# Patient Record
Sex: FEMALE | URBAN - METROPOLITAN AREA
[De-identification: names, ages, dates, MRNs, and addresses within clinical notes are randomized per-mention and may not be internally consistent; named-entity substitution may affect disease eponyms.]

---

## 2023-08-18 ENCOUNTER — HOME HEALTH ADMISSION (OUTPATIENT)
Dept: HOME HEALTH SERVICES | Facility: HOME HEALTH | Age: 83
End: 2023-08-18
Payer: MEDICARE

## 2023-08-20 PROCEDURE — 0221000100 HH NO PAY CLAIM PROCEDURE

## 2023-08-22 ENCOUNTER — HOME CARE VISIT (OUTPATIENT)
Dept: SCHEDULING | Facility: HOME HEALTH | Age: 83
End: 2023-08-22

## 2023-08-22 VITALS — TEMPERATURE: 97.2 F | RESPIRATION RATE: 18 BRPM | HEART RATE: 85 BPM | OXYGEN SATURATION: 96 %

## 2023-08-22 VITALS
RESPIRATION RATE: 18 BRPM | HEART RATE: 84 BPM | OXYGEN SATURATION: 96 % | DIASTOLIC BLOOD PRESSURE: 74 MMHG | TEMPERATURE: 97.5 F | SYSTOLIC BLOOD PRESSURE: 120 MMHG

## 2023-08-22 PROCEDURE — G0300 HHS/HOSPICE OF LPN EA 15 MIN: HCPCS

## 2023-08-22 PROCEDURE — G0151 HHCP-SERV OF PT,EA 15 MIN: HCPCS

## 2023-08-22 ASSESSMENT — ENCOUNTER SYMPTOMS
PAIN LOCATION - PAIN QUALITY: ACHY
STOOL DESCRIPTION: FORMED
DYSPNEA ACTIVITY LEVEL: AFTER AMBULATING 10 - 20 FT
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
## 2023-08-22 NOTE — HOME HEALTH
SN greeted patient in private home resides with spouse. Daugher is present at time of visit. Daughter is visiting from [place]. Patient is AOx4, pleasant, in no apparent distress upon arrival  VSS. HRR, LCTA, BS WNL, Wound care performed using clean technique, patient tolerated well. Patient states appetite is good and hydration is adequate. Patient denies having any pain at this time. Medications reviewed, no issues to report. Patient is homebound due to weakness, impaired functional mobility, risk for fall, risk for infection, patient requires use of assistive device and patient requires assistance of another person to leave home safely. SN educated patient/caregiver on disease process and management, home safety, using assistive device at all times, fall precautions, s/s of infection, taking medications as prescribed, call me first procedure, s/s to report to nurse, physician, and s/s that necessitate calling emergency personnel. Patient/caregiver repeated back and verbalized understanding, all questions answered  Medications reconciled and all medications are available in home  Home health supplies by type and quantity ordered/delivered this visit N/A  Patient education provided this visit: SN educated patient on signs of infection. Patient  verbalizes understanding via the teach back method.    Progress toward goals:Ongoing  Plan for next visit: wound care

## 2023-08-23 ENCOUNTER — HOME CARE VISIT (OUTPATIENT)
Dept: SCHEDULING | Facility: HOME HEALTH | Age: 83
End: 2023-08-23

## 2023-08-23 ENCOUNTER — HOME CARE VISIT (OUTPATIENT)
Dept: HOME HEALTH SERVICES | Facility: HOME HEALTH | Age: 83
End: 2023-08-23

## 2023-08-23 VITALS
HEART RATE: 83 BPM | SYSTOLIC BLOOD PRESSURE: 123 MMHG | TEMPERATURE: 97.2 F | RESPIRATION RATE: 18 BRPM | DIASTOLIC BLOOD PRESSURE: 60 MMHG | OXYGEN SATURATION: 96 %

## 2023-08-23 PROCEDURE — G0152 HHCP-SERV OF OT,EA 15 MIN: HCPCS

## 2023-08-23 PROCEDURE — G0155 HHCP-SVS OF CSW,EA 15 MIN: HCPCS

## 2023-08-23 ASSESSMENT — ENCOUNTER SYMPTOMS
PAIN LOCATION - PAIN QUALITY: ACHE, THROB
HEMOPTYSIS: 0
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
## 2023-08-23 NOTE — HOME HEALTH
decresaed standing tolerance/balance requiring frequent rest periods with ADL's and IADL's and UE support at all times. OT to assess shower transfer when grab bars are installed. Pt would benefit with OT to increase safety and indep with ADL's, IADL's, increase bilat UE strength for functional transfers, adaptive equipment recommendations and training, increasing standing tolerance/balance for safety with ADL's with fewer rest periods. Pt's goal is to return to PLOF and return to meal prep.    Discussed DC plan  with pt and caregiver, plan to DC when goals met

## 2023-08-24 ENCOUNTER — HOME CARE VISIT (OUTPATIENT)
Dept: SCHEDULING | Facility: HOME HEALTH | Age: 83
End: 2023-08-24

## 2023-08-24 PROCEDURE — G0299 HHS/HOSPICE OF RN EA 15 MIN: HCPCS

## 2023-08-25 ENCOUNTER — HOME CARE VISIT (OUTPATIENT)
Dept: SCHEDULING | Facility: HOME HEALTH | Age: 83
End: 2023-08-25

## 2023-08-25 PROCEDURE — G0151 HHCP-SERV OF PT,EA 15 MIN: HCPCS

## 2023-08-27 VITALS — TEMPERATURE: 97.4 F | RESPIRATION RATE: 18 BRPM | OXYGEN SATURATION: 96 % | HEART RATE: 83 BPM

## 2023-08-28 ENCOUNTER — HOME CARE VISIT (OUTPATIENT)
Dept: SCHEDULING | Facility: HOME HEALTH | Age: 83
End: 2023-08-28

## 2023-08-28 VITALS
OXYGEN SATURATION: 97 % | DIASTOLIC BLOOD PRESSURE: 70 MMHG | SYSTOLIC BLOOD PRESSURE: 126 MMHG | HEART RATE: 78 BPM | TEMPERATURE: 97.5 F | RESPIRATION RATE: 16 BRPM

## 2023-08-28 PROCEDURE — G0151 HHCP-SERV OF PT,EA 15 MIN: HCPCS

## 2023-08-28 ASSESSMENT — ENCOUNTER SYMPTOMS: DYSPNEA ACTIVITY LEVEL: AFTER AMBULATING 10 - 20 FT

## 2023-08-28 NOTE — HOME HEALTH
SN for skilled assessments and wound care management/teaching. Wound care completed as ordered in POC, tolerated well. Wound progressing in healing. No s/s of infection. Patient denies any problems with wound care and aware of s/s of infection to report to /MD/91Oralia. VS all wnl, denies any changes in medication, shob, falls, any new open wounds, problems/questions at this time. No s/s of distress during SN visit. Aware and approves of next scheduled SN visit.  Plan for next visit:  wound care, medication/disease teaching/management

## 2023-08-29 ENCOUNTER — HOME CARE VISIT (OUTPATIENT)
Dept: SCHEDULING | Facility: HOME HEALTH | Age: 83
End: 2023-08-29

## 2023-08-29 VITALS — OXYGEN SATURATION: 94 % | TEMPERATURE: 97.2 F | RESPIRATION RATE: 18 BRPM | HEART RATE: 85 BPM

## 2023-08-29 PROCEDURE — G0300 HHS/HOSPICE OF LPN EA 15 MIN: HCPCS

## 2023-08-29 ASSESSMENT — ENCOUNTER SYMPTOMS: PAIN LOCATION - PAIN QUALITY: ACHY
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
## 2023-08-30 VITALS
OXYGEN SATURATION: 98 % | TEMPERATURE: 97.5 F | RESPIRATION RATE: 20 BRPM | HEART RATE: 74 BPM | DIASTOLIC BLOOD PRESSURE: 76 MMHG | SYSTOLIC BLOOD PRESSURE: 120 MMHG

## 2023-08-30 ASSESSMENT — ENCOUNTER SYMPTOMS
COUGH CHARACTERISTICS: PRODUCTIVE
COUGH: 1

## 2023-08-30 NOTE — HOME HEALTH
SN greeted patient in private home resides with spouse. Daugher is present at time of visit. Daughter is visiting from [place]. Patient and spouse they are planning to stay in home. SN provided patient with poster for window regarding use of 02 in home. Spouse placed in front room window. Patient is AOx4, pleasant, in no apparent distress upon arrival  VSS. HRR, LCTA, BS WNL, Wound care performed using clean technique, patient tolerated well. Patient states appetite is good and hydration is adequate. Patient denies having any pain at this time. Medications reviewed, no issues to report. Patient is homebound due to weakness, impaired functional mobility, risk for fall, risk for infection, patient requires use of assistive device and patient requires assistance of another person to leave home safely. SN educated patient/caregiver on disease process and management, home safety, using assistive device at all times, fall precautions, s/s of infection, taking medications as prescribed, call me first procedure, s/s to report to nurse, physician, and s/s that necessitate calling emergency personnel. Patient/caregiver repeated back and verbalized understanding, all questions answered  Medications reconciled and all medications are available in home  Home health supplies by type and quantity ordered/delivered this visit N/A  Patient education provided this visit: SN educated patient on signs of infection. Patient  verbalizes understanding via the teach back method.    Progress toward goals:Ongoing  Plan for next visit: wound care

## 2023-08-31 ENCOUNTER — HOME CARE VISIT (OUTPATIENT)
Dept: SCHEDULING | Facility: HOME HEALTH | Age: 83
End: 2023-08-31

## 2023-08-31 VITALS — RESPIRATION RATE: 18 BRPM | OXYGEN SATURATION: 95 % | HEART RATE: 18 BPM | TEMPERATURE: 97 F

## 2023-08-31 PROCEDURE — G0151 HHCP-SERV OF PT,EA 15 MIN: HCPCS

## 2023-08-31 ASSESSMENT — ENCOUNTER SYMPTOMS: PAIN LOCATION - PAIN QUALITY: ACHY

## 2023-08-31 NOTE — HOME HEALTH
OT spoke to Pt's daughter to schedule visit for this week. Pt has ortho appointment today  and unable to schedule for today. Manjinder Stoddard The physician office was not contacted via phone but the MISSED VISIT note is being sent via fax/mail and is recorded in the MEDIA tab.
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
## 2023-09-01 ENCOUNTER — HOME CARE VISIT (OUTPATIENT)
Dept: SCHEDULING | Facility: HOME HEALTH | Age: 83
End: 2023-09-01

## 2023-09-01 PROCEDURE — G0300 HHS/HOSPICE OF LPN EA 15 MIN: HCPCS

## 2023-09-03 VITALS
TEMPERATURE: 97.4 F | RESPIRATION RATE: 18 BRPM | DIASTOLIC BLOOD PRESSURE: 64 MMHG | OXYGEN SATURATION: 98 % | HEART RATE: 64 BPM | SYSTOLIC BLOOD PRESSURE: 124 MMHG

## 2023-09-03 ASSESSMENT — ENCOUNTER SYMPTOMS
COUGH: 1
COUGH CHARACTERISTICS: PRODUCTIVE

## 2023-09-05 ENCOUNTER — HOME CARE VISIT (OUTPATIENT)
Dept: SCHEDULING | Facility: HOME HEALTH | Age: 83
End: 2023-09-05
Payer: MEDICARE

## 2023-09-05 VITALS — HEART RATE: 88 BPM | TEMPERATURE: 97.3 F | OXYGEN SATURATION: 94 %

## 2023-09-05 PROCEDURE — G0300 HHS/HOSPICE OF LPN EA 15 MIN: HCPCS

## 2023-09-05 PROCEDURE — G0151 HHCP-SERV OF PT,EA 15 MIN: HCPCS

## 2023-09-05 ASSESSMENT — ENCOUNTER SYMPTOMS: PAIN LOCATION - PAIN QUALITY: ACHY
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
## 2023-09-06 NOTE — HOME HEALTH
This patient has answered NO to the following questions:   1) have you traveled outside the country   2) have you been exposed to or been in contact with anyone whom traveled outside the country in the past two weeks   3) do you have a sore throat/fever/dry cough      4)The patient has been educated on and demonstrates good understanding via the teach-back method for the following: Signs and symptoms of COVID-19 yes    OT arrived at pt's home with daughter present for scheduled OT visit. Pt was lying on couch and stating she was not feeling well, did not sleep well . Pt had PT this am and was not feeling well enough to participate in OT today. No viist done today and will return 9/8/23 for OT. Pt has ortho follow up 9/7/23 and PT scheduled as well for 9/8/23.

## 2023-09-07 ENCOUNTER — HOME CARE VISIT (OUTPATIENT)
Dept: SCHEDULING | Facility: HOME HEALTH | Age: 83
End: 2023-09-07
Payer: MEDICARE

## 2023-09-07 PROCEDURE — G0300 HHS/HOSPICE OF LPN EA 15 MIN: HCPCS

## 2023-09-08 ENCOUNTER — HOME CARE VISIT (OUTPATIENT)
Dept: SCHEDULING | Facility: HOME HEALTH | Age: 83
End: 2023-09-08
Payer: MEDICARE

## 2023-09-08 VITALS
DIASTOLIC BLOOD PRESSURE: 54 MMHG | TEMPERATURE: 97.5 F | OXYGEN SATURATION: 97 % | SYSTOLIC BLOOD PRESSURE: 126 MMHG | HEART RATE: 90 BPM

## 2023-09-08 PROCEDURE — G0151 HHCP-SERV OF PT,EA 15 MIN: HCPCS

## 2023-09-08 PROCEDURE — G0158 HHC OT ASSISTANT EA 15: HCPCS

## 2023-09-08 ASSESSMENT — ENCOUNTER SYMPTOMS: PAIN LOCATION - PAIN QUALITY: DISCOMFORT

## 2023-09-08 NOTE — HOME HEALTH
Pt agreeable to tx . Just finished with PT , but willing to participate . Np reported incidents, no med changes, but new orders recieved from ortho to WBAT on UE and LE and use of walker without platform. Pt transitioned from side lying to sit unsupported on sofa indep and maintained for 40+ min to complete (B) UE AROM and dynainc sitting exercises with no increased pain reported , no indication of pain,  (B) UE AROM WFL  Provided with HEP for UE AROM , resistance and dynamic sitting with Pt deonstrating understaning of exercises , and verbalizing need to do daily. Pt still sleeping on the sofa in livingroom and using bedside commode rather than walking to bathroom. Encouraged Pt and  to enforce , attempting to use bathroom during the day . Pt agreed and stated the PT had told her the same. Pt coopertive and pleasant with Tx.  attentive and engaged . Pt reproted occasional dizziness with HX of vertigo . Educated on importance of hydration and need to increased water intake to decreased cahnces of veritgo and due to BP trending on the low side lately. Pt verbalized understanding via teach back method. Pt will advance toward goals and dc when met . OTR will see for next Tx.

## 2023-09-09 VITALS
SYSTOLIC BLOOD PRESSURE: 120 MMHG | TEMPERATURE: 97.4 F | HEART RATE: 76 BPM | TEMPERATURE: 97.6 F | OXYGEN SATURATION: 98 % | SYSTOLIC BLOOD PRESSURE: 124 MMHG | DIASTOLIC BLOOD PRESSURE: 60 MMHG | RESPIRATION RATE: 18 BRPM | DIASTOLIC BLOOD PRESSURE: 74 MMHG | OXYGEN SATURATION: 98 % | RESPIRATION RATE: 18 BRPM | HEART RATE: 80 BPM

## 2023-09-09 ASSESSMENT — ENCOUNTER SYMPTOMS
SPUTUM AMOUNT: SCANT
SPUTUM PRODUCTION: 1
CHOKING: 1
SPUTUM COLOR: WHITE
SPUTUM CONSISTENCY: THIN
COUGH: 1
COUGH CHARACTERISTICS: PRODUCTIVE

## 2023-09-09 NOTE — HOME HEALTH
SN greeted patient in private home resides with spouse. Daugher is present at time of visit. Patient is AOx4, pleasant, in no apparent distress upon arrival  VSS. HRR, LCTA, BS WNL, Wound care performed using clean technique, patient tolerated well. Patient states appetite is good and hydration is adequate. Patient denies having any pain at this time. Medications reviewed, no issues to report. Patient is homebound due to weakness, impaired functional mobility, risk for fall, risk for infection, patient requires use of assistive device and patient requires assistance of another person to leave home safely. SN educated patient/caregiver on disease process and management, home safety, using assistive device at all times, fall precautions, s/s of infection, taking medications as prescribed, call me first procedure, s/s to report to nurse, physician, and s/s that necessitate calling emergency personnel. Patient/caregiver repeated back and verbalized understanding, all questions answered  Medications reconciled and all medications are available in home  Home health supplies by type and quantity ordered/delivered this visit foam dressings  Patient education provided this visit: SN educated patient on signs of infection. Patient  verbalizes understanding via the teach back method.    Progress toward goals:Ongoing  Plan for next visit: wound care

## 2023-09-09 NOTE — HOME HEALTH
SN greeted patient in private home resides with spouse. Daugher is present at time of visit. Daughter is visiting from [place]. Patient reports going to Orthopedic physician today. Patient is AOx4, pleasant, in no apparent distress upon arrival  VSS. HRR, LCTA, BS WNL, Wound care performed using clean technique, patient tolerated well. Patient states appetite is good and hydration is adequate. Patient denies having any pain at this time. Medications reviewed, no issues to report. Patient is homebound due to weakness, impaired functional mobility, risk for fall, risk for infection, patient requires use of assistive device and patient requires assistance of another person to leave home safely. SN educated patient/caregiver on disease process and management, home safety, using assistive device at all times, fall precautions, s/s of infection, taking medications as prescribed, call me first procedure, s/s to report to nurse, physician, and s/s that necessitate calling emergency personnel. Patient/caregiver repeated back and verbalized understanding, all questions answered  Medications reconciled and all medications are available in home  Home health supplies by type and quantity ordered/delivered this visit na  Patient education provided this visit: SN educated patient on signs of infection. Patient  verbalizes understanding via the teach back method.    Progress toward goals:Ongoing  Plan for next visit: wound care

## 2023-09-12 ENCOUNTER — HOME CARE VISIT (OUTPATIENT)
Dept: SCHEDULING | Facility: HOME HEALTH | Age: 83
End: 2023-09-12
Payer: MEDICARE

## 2023-09-12 VITALS
SYSTOLIC BLOOD PRESSURE: 118 MMHG | OXYGEN SATURATION: 98 % | HEART RATE: 78 BPM | TEMPERATURE: 97.6 F | RESPIRATION RATE: 18 BRPM | DIASTOLIC BLOOD PRESSURE: 58 MMHG

## 2023-09-12 PROCEDURE — G0300 HHS/HOSPICE OF LPN EA 15 MIN: HCPCS

## 2023-09-12 NOTE — HOME HEALTH
SN greeted patient in private home resides with spouse. Daughter is present at time of visit. Daughter is visiting from [place]. Daughter reports heart rate increase over the week end, patient had a physician appt yesterday the physician increase propranolol (INDERAL also suggested that the patient to see a cardolocardiologist . Patient teaching with Heart and respiratory   Patient is AOx4, pleasant, in no apparent distress upon arrival  VSS. HRR, LCTA, BS WNL, Wound care performed using clean technique, patient tolerated well. Patient states appetite is good and hydration is adequate. Patient denies having any pain at this time. Medications reviewed, no issues to report. Patient is homebound due to weakness, impaired functional mobility, risk for fall, risk for infection, patient requires use of assistive device and patient requires assistance of another person to leave home safely. SN educated patient/caregiver on disease process and management, home safety, using assistive device at all times, fall precautions, s/s of infection, taking medications as prescribed, call me first procedure, s/s to report to nurse, physician, and s/s that necessitate calling emergency personnel. Patient/caregiver repeated back and verbalized understanding, all questions answered  Medications reconciled and all medications are available in home  Home health supplies by type and quantity ordered/delivered this visit na  Patient education provided this visit: SN educated patient on signs of infection. Patient  verbalizes understanding via the teach back method.    Progress toward goals:Ongoing  Plan for next visit: wound care

## 2023-09-13 ENCOUNTER — HOME CARE VISIT (OUTPATIENT)
Dept: SCHEDULING | Facility: HOME HEALTH | Age: 83
End: 2023-09-13
Payer: MEDICARE

## 2023-09-13 VITALS
HEART RATE: 69 BPM | OXYGEN SATURATION: 99 % | SYSTOLIC BLOOD PRESSURE: 115 MMHG | TEMPERATURE: 98.8 F | DIASTOLIC BLOOD PRESSURE: 50 MMHG

## 2023-09-13 PROCEDURE — G0151 HHCP-SERV OF PT,EA 15 MIN: HCPCS

## 2023-09-13 PROCEDURE — G0158 HHC OT ASSISTANT EA 15: HCPCS

## 2023-09-13 ASSESSMENT — ENCOUNTER SYMPTOMS: PAIN LOCATION - PAIN QUALITY: ACHE

## 2023-09-13 NOTE — HOME HEALTH
Pt agreeable to Tx . REported starting timed release beta blocker, Metropol, 25MG  last night to replace current bata blocker . No reported incidents. REviwed HEP with Pt requiring cues to complete propertly . TOlerated 8-10 reps in all planes. with less reps in shoulder flexion with increased pulse to 92 BPM requiring approx one min seated rest with pursed lip breathing . Pt seated unsupported and supported with light pillow, to complete UE exercises and dynamic sitting exercises . REinforced pursed lip breathing and pacing with energy conservation techniques reviewed with Pt demonstrating good pacing and breathing during exercises . Pt maintained 5 min and 35 seconds standing and amb with walker to complete toilet transfers and manage 02 tubing with min A required to manage tubing. Sp02 on 1.5 L 02 remained over 93% , pulse increased to 92 BPM  .  Pt stated she has been using the bathroom during the day and commode only for over night . Pt cooperative and motivated to return to [facility] . Pt will remain safe in home with husbnd and daughter and advance toward goals then DC when met.

## 2023-09-14 ENCOUNTER — HOME CARE VISIT (OUTPATIENT)
Dept: SCHEDULING | Facility: HOME HEALTH | Age: 83
End: 2023-09-14
Payer: MEDICARE

## 2023-09-14 VITALS
RESPIRATION RATE: 18 BRPM | TEMPERATURE: 97.6 F | SYSTOLIC BLOOD PRESSURE: 95 MMHG | DIASTOLIC BLOOD PRESSURE: 65 MMHG | OXYGEN SATURATION: 96 % | HEART RATE: 77 BPM

## 2023-09-14 PROCEDURE — G0300 HHS/HOSPICE OF LPN EA 15 MIN: HCPCS

## 2023-09-14 ASSESSMENT — ENCOUNTER SYMPTOMS: PAIN LOCATION - PAIN QUALITY: ACHY

## 2023-09-14 NOTE — HOME HEALTH
Patient reports pain in shoulders today. Worked on STM to upper trapezius bilaterally. Instructed in postural correction during ambulation with VCs to lower shoulders when she is pushing the walker. Patient ambulated 100 feet today with roller walker today with occasionally assistance with oxygen tubing. Patient demonstrates good bilateral foot clearance but has limited endurance. Therapeutic exs in standing: heel raises, marching, hip abduction alternating legs, sit<>stand. Patient participated well in all activities today.  Plan is to progress to going up and down stairs so that she can walk out to car without having to rest.

## 2023-09-15 ENCOUNTER — HOME CARE VISIT (OUTPATIENT)
Dept: SCHEDULING | Facility: HOME HEALTH | Age: 83
End: 2023-09-15
Payer: MEDICARE

## 2023-09-15 VITALS
SYSTOLIC BLOOD PRESSURE: 115 MMHG | HEART RATE: 77 BPM | TEMPERATURE: 99.1 F | OXYGEN SATURATION: 99 % | DIASTOLIC BLOOD PRESSURE: 60 MMHG

## 2023-09-15 PROCEDURE — G0158 HHC OT ASSISTANT EA 15: HCPCS

## 2023-09-15 NOTE — HOME HEALTH
Pt asleep upon MILTON arrival . Pts  stated she was trying to stay awake , but thsi is her usual nap time , between 12 - 1:30  Pt side lying on sofa but awakened easily and agreeabl eto try to do Tx. Pt transitioned indep from side lying to sitting unsupported on sofa. Only pain is in coccyx . No reported med changes since Metropolo timed release started earlier this week . No incidents. Pt stated she is walking to bathroom better during the day with  A with 02 tubing . Pt stated throwing tubing over her shoulder to allow it to follow her , was not working because the water from the humidifier backs up in the tube. Pt seated on sofa in unsupported sitting 15 + min for performance of (B) UE AROM and introduction of resistance with yellow resistnace band . Pt was able to complete 7- 10 reps with band in multiple planes with (L) UE weaker and less tolerance than (R) . No pain reported , just muscle fatigue. Pt was very tired , closing eyes  frequently , but trying . AFter approx 20 min she stated she had just had enough. REviewed energy conservation and encoureaged Pt to do as much for her self as she could with goal for next week to put her own socks on . Pt agreed to do her best . Pt will advance toward goals and dc when met. Pt on continual 02 at 1.5 L . COoperative and pleasant.

## 2023-09-17 VITALS
DIASTOLIC BLOOD PRESSURE: 58 MMHG | TEMPERATURE: 97.6 F | HEART RATE: 68 BPM | OXYGEN SATURATION: 98 % | SYSTOLIC BLOOD PRESSURE: 110 MMHG | RESPIRATION RATE: 18 BRPM

## 2023-09-19 ENCOUNTER — HOME CARE VISIT (OUTPATIENT)
Dept: SCHEDULING | Facility: HOME HEALTH | Age: 83
End: 2023-09-19
Payer: MEDICARE

## 2023-09-19 VITALS
HEART RATE: 94 BPM | DIASTOLIC BLOOD PRESSURE: 58 MMHG | SYSTOLIC BLOOD PRESSURE: 122 MMHG | OXYGEN SATURATION: 95 % | TEMPERATURE: 97.4 F | RESPIRATION RATE: 18 BRPM

## 2023-09-19 VITALS
OXYGEN SATURATION: 96 % | TEMPERATURE: 97.1 F | DIASTOLIC BLOOD PRESSURE: 58 MMHG | RESPIRATION RATE: 18 BRPM | SYSTOLIC BLOOD PRESSURE: 116 MMHG | HEART RATE: 84 BPM

## 2023-09-19 PROCEDURE — G0299 HHS/HOSPICE OF RN EA 15 MIN: HCPCS

## 2023-09-19 PROCEDURE — G0152 HHCP-SERV OF OT,EA 15 MIN: HCPCS

## 2023-09-19 ASSESSMENT — ENCOUNTER SYMPTOMS
DYSPNEA ACTIVITY LEVEL: AFTER AMBULATING LESS THAN 10 FT
PAIN LOCATION - PAIN QUALITY: BURNING, ACHE

## 2023-09-19 NOTE — HOME HEALTH
pt seen today for follow up visit, wound care provided as ordered, pt tolerated, well encouraged to increase food/fluid intake as she only consumes 2 bottles of water per day, all questions and concerns addressed, will continue to monitor,

## 2023-09-19 NOTE — HOME HEALTH
This patient has answered NO to the following questions:   1) have you traveled outside the country   2) have you been exposed to or been in contact with anyone whom traveled outside the country in the past two weeks   3) do you have a sore throat/fever/dry cough      4)The patient has been educated on and demonstrates good understanding via the teach-back method for the following: Signs and symptoms of COVID-19 yes    No reported changes or incidents since previous visit  Pt seen for OT reassessment today. Pt demo's iproved activity tolerance, increased indep and safety with ADLs requiring min/CGA for ADL's. Pt demo's improved standing tolerance with fewer rest periods required and improved safety with oxygeen tubing. Pt demo's improved left UE function with improved strength but still decreased strength for transfers, ADL's. Pt able to tolerate unsupported sitting with improved posture to assist with breathing. Pt would benefit with further OT to increase indep with ADL's and IADL's as pt would like to return to light kitchen tasks with good safety with oxygen and improved endurance requiring fewer rest periods. Pt has assist from daughter for bathing requiring further OT to increase indep. Pt receptive to further OT and motivated to increase indep with ADLs and return to PLOF with less assist from caregivers.    Discussed DC plan  with pt and caregiver, plan to DC when goals met  OT to request further OT orders from MD.

## 2023-09-20 ENCOUNTER — HOME CARE VISIT (OUTPATIENT)
Dept: SCHEDULING | Facility: HOME HEALTH | Age: 83
End: 2023-09-20
Payer: MEDICARE

## 2023-09-20 VITALS
TEMPERATURE: 97.3 F | HEART RATE: 83 BPM | DIASTOLIC BLOOD PRESSURE: 78 MMHG | OXYGEN SATURATION: 98 % | SYSTOLIC BLOOD PRESSURE: 120 MMHG

## 2023-09-20 PROCEDURE — G0151 HHCP-SERV OF PT,EA 15 MIN: HCPCS

## 2023-09-20 ASSESSMENT — ENCOUNTER SYMPTOMS
DYSPNEA ACTIVITY LEVEL: AFTER AMBULATING MORE THAN 20 FT
STOOL DESCRIPTION: FORMED
PAIN LOCATION - PAIN QUALITY: ACHY

## 2023-09-20 NOTE — HOME HEALTH
Patient reports she strained her left shoulder doing her UE exs. Worked on STM to upper trapezius bilaterally. Reviewed postural correction during ambulation with VCs -  lowering shoulders when she is pushing the walker. Patient ambulated 150 feet today with one rest period with roller walker today with occasional assistance with oxygen tubing. Patient demonstrates good bilateral foot clearance but has limited endurance. Therapeutic exs in standing: heel raises, marching, hip abduction alternating legs, sit<>stand x 5. Addition of mini-squats to HEP. Patient was able to 10 reps of all exs in standing today. She is independent in HEP of sitting exs and demonstrates good ex technique. Patient participated well in all activities today.  Plan  progress to going up and down stairs so that she can walk out to car without having to rest.

## 2023-09-21 ENCOUNTER — HOME CARE VISIT (OUTPATIENT)
Dept: SCHEDULING | Facility: HOME HEALTH | Age: 83
End: 2023-09-21
Payer: MEDICARE

## 2023-09-21 PROCEDURE — G0299 HHS/HOSPICE OF RN EA 15 MIN: HCPCS

## 2023-09-22 VITALS
SYSTOLIC BLOOD PRESSURE: 108 MMHG | HEART RATE: 76 BPM | DIASTOLIC BLOOD PRESSURE: 68 MMHG | RESPIRATION RATE: 16 BRPM | OXYGEN SATURATION: 96 % | TEMPERATURE: 97.4 F

## 2023-09-25 ASSESSMENT — ENCOUNTER SYMPTOMS: DYSPNEA ACTIVITY LEVEL: AFTER AMBULATING 10 - 20 FT

## 2023-09-26 ENCOUNTER — HOME CARE VISIT (OUTPATIENT)
Dept: SCHEDULING | Facility: HOME HEALTH | Age: 83
End: 2023-09-26
Payer: MEDICARE

## 2023-09-26 VITALS
SYSTOLIC BLOOD PRESSURE: 122 MMHG | OXYGEN SATURATION: 97 % | TEMPERATURE: 97.5 F | HEART RATE: 72 BPM | RESPIRATION RATE: 16 BRPM | DIASTOLIC BLOOD PRESSURE: 64 MMHG

## 2023-09-26 PROCEDURE — G0299 HHS/HOSPICE OF RN EA 15 MIN: HCPCS

## 2023-09-26 ASSESSMENT — ENCOUNTER SYMPTOMS: DYSPNEA ACTIVITY LEVEL: AFTER AMBULATING 10 - 20 FT

## 2023-09-26 NOTE — HOME HEALTH
SN for skilled assessments and wound care management/teaching. Wound care completed as ordered in POC, tolerated well. Wound progressing in healing. No s/s of infection. Pt taking in proper nutrition to aid with wound healing, increased protein intake. Patient denies any problems with wound care and aware of s/s of infection to report to /MD/Dav. VS all wnl, denies any shob, falls, any new open wounds, problems/questions at this time. Medications reconciled, denies any changes in medication since last SN visit. No s/s of distress during SN visit. Aware and approves of next scheduled SN visit. Plan for next visit:  wound care, medication/disease teaching/management. SN instructed on home safety, fall precautions and Call Me First procedure,  Verbalizes understanding via the teach back method. Progressing towards goals, see care plan documentation. The following discharge planning was discussed with the patient/caregiver: home care agency discharge to be completed when goals met. Patient in agreement. This patient remains homebound. See Homebound Status within Visit Record.

## 2023-09-28 ENCOUNTER — HOME CARE VISIT (OUTPATIENT)
Dept: SCHEDULING | Facility: HOME HEALTH | Age: 83
End: 2023-09-28
Payer: MEDICARE

## 2023-09-28 VITALS
OXYGEN SATURATION: 97 % | HEART RATE: 84 BPM | DIASTOLIC BLOOD PRESSURE: 60 MMHG | TEMPERATURE: 97.6 F | SYSTOLIC BLOOD PRESSURE: 110 MMHG

## 2023-09-28 PROCEDURE — G0151 HHCP-SERV OF PT,EA 15 MIN: HCPCS

## 2023-09-28 PROCEDURE — G0299 HHS/HOSPICE OF RN EA 15 MIN: HCPCS

## 2023-09-28 ASSESSMENT — ENCOUNTER SYMPTOMS: PAIN LOCATION - PAIN QUALITY: ACHY

## 2023-09-28 NOTE — HOME HEALTH
Verbal orders to continue with PT services 1w4 received from Dr. Smith Pouch office this morning at 9am with goal of working on going up and down steps to home to access car and in preparation with re-entry into community. Review of breathing techniques prior activity. Worked on management of oxygen tubing going out the front door and down 4 steps using rail with CG-min assist and lots of verbal cues for foot and hand placement. Standing rest at bottom of steps with incorporation of diaphragmatic breathing and pursed lips exhalation. Patient walked to car-port and back using roller walker x 30 feet, stopping when she turned and practicing more breathing techniques. Patient reports difficulty with endurance secondary to very high humidity today. She walked back into home and SaO2 was > 95% within 3 mins, MA back down in to the 80s within 3-4 minutes. Instructed patient in going up and down stairs at back of house also which has a short rail. STep by step instruction for foot and hand placement and cg-min assist but patient was able to complete task, going down steps, turns and coming back up,  and maintain SaO2> 90%. Discussed getting portable on demand bottles and progressing to using 4ww for ambulation to enable her to be more independent with transfers out to care. Patient is in agreement with same and is very pleased with her progress today.

## 2023-09-29 ENCOUNTER — HOME CARE VISIT (OUTPATIENT)
Dept: SCHEDULING | Facility: HOME HEALTH | Age: 83
End: 2023-09-29
Payer: MEDICARE

## 2023-09-29 VITALS
HEART RATE: 88 BPM | SYSTOLIC BLOOD PRESSURE: 120 MMHG | OXYGEN SATURATION: 96 % | DIASTOLIC BLOOD PRESSURE: 50 MMHG | TEMPERATURE: 99.6 F

## 2023-09-29 PROCEDURE — G0158 HHC OT ASSISTANT EA 15: HCPCS

## 2023-09-29 ASSESSMENT — ENCOUNTER SYMPTOMS: PAIN LOCATION - PAIN QUALITY: BURNING

## 2023-09-29 NOTE — HOME HEALTH
Pt agreeable to Tx. No reported changes to meds or incidents . Pt side lying on sofa , indep with transition to sitting unsupported to complete HEP . Pt requiried min cues and demonstration to complete and Pt has not been performing resistnace with yellow band or 1# wt since she tried yellow band exercises and they caused her shoudlers to ache . Pt toerated 1# biceps curls and cross curls today in reps of 10 with no c/op pain . Encouraged Pt to drink water after strenuous exercise to reduce chance of muslce aches. Dynamic standing with walker for support for 3 min and 5 + min to complete  one hand reaching  tasks amb and wt shift exercises. some increased pain in (L) grion area reported with wt shift exercises. No drop in Sp02  on 1.5 L 02 continualy, l heart rate incresaed to 99 bpm. Addressed energy conservation with reporting that she alternates sitting and standing and takes breaks to just breathe , during ADL tasks. Educated Pt and  on the imprtance of including some strengthening exercises for safety and indep. Pt agreed to try with water bottle .  Pt will advance toward goals and dc alfonso met Will assess bathroom trnasfers nest tx

## 2023-10-02 VITALS
TEMPERATURE: 97.4 F | HEART RATE: 83 BPM | OXYGEN SATURATION: 97 % | DIASTOLIC BLOOD PRESSURE: 74 MMHG | SYSTOLIC BLOOD PRESSURE: 106 MMHG | RESPIRATION RATE: 16 BRPM

## 2023-10-02 ASSESSMENT — ENCOUNTER SYMPTOMS
PAIN LOCATION - PAIN QUALITY: STABBING
DYSPNEA ACTIVITY LEVEL: AFTER AMBULATING 10 - 20 FT

## 2023-10-03 ENCOUNTER — HOME CARE VISIT (OUTPATIENT)
Dept: SCHEDULING | Facility: HOME HEALTH | Age: 83
End: 2023-10-03
Payer: MEDICARE

## 2023-10-03 VITALS
DIASTOLIC BLOOD PRESSURE: 60 MMHG | SYSTOLIC BLOOD PRESSURE: 116 MMHG | TEMPERATURE: 98 F | OXYGEN SATURATION: 94 % | HEART RATE: 80 BPM | RESPIRATION RATE: 18 BRPM

## 2023-10-03 PROCEDURE — G0299 HHS/HOSPICE OF RN EA 15 MIN: HCPCS

## 2023-10-03 ASSESSMENT — ENCOUNTER SYMPTOMS: DYSPNEA ACTIVITY LEVEL: AT REST

## 2023-10-03 NOTE — HOME HEALTH
SN greeted patient in home where she resides with spouse and daughter present. Patient is AOx4 in no apparent distress and agreeable to visit. SN assessment completed, condition stable, lungs clear in all fields, HRR, skin assessment reveals wound to sacrum, wound care order reviewed, wound care performed by SN using clean technique, patient tolerated well, no s/s of infection noted. Patient states appetite and hydration are WNL. Patient denies having any pain at this time. Patient uses 1.5L oxygen via nasal cannula on concentrator for SOB. Medications reviewed, no changes or issues to report at this time. SN educated patient on disease process and management, med management, infection control, home safety, fall precautions, taking medications as prescribed, call me first procedure, s/s to report to nurse, physician, and s/s that necessitate calling emergency personnel. Patient/spouse verbalized understanding via teachback method, all questions answered. SN ended visit with patient AOx4 in no apparent distress and agreeable to POC.

## 2023-10-04 ENCOUNTER — HOME CARE VISIT (OUTPATIENT)
Dept: SCHEDULING | Facility: HOME HEALTH | Age: 83
End: 2023-10-04
Payer: MEDICARE

## 2023-10-04 PROCEDURE — G0151 HHCP-SERV OF PT,EA 15 MIN: HCPCS

## 2023-10-05 ENCOUNTER — HOME CARE VISIT (OUTPATIENT)
Dept: SCHEDULING | Facility: HOME HEALTH | Age: 83
End: 2023-10-05
Payer: MEDICARE

## 2023-10-05 VITALS
TEMPERATURE: 97.9 F | HEART RATE: 69 BPM | SYSTOLIC BLOOD PRESSURE: 100 MMHG | RESPIRATION RATE: 18 BRPM | OXYGEN SATURATION: 95 % | DIASTOLIC BLOOD PRESSURE: 60 MMHG

## 2023-10-05 PROCEDURE — G0299 HHS/HOSPICE OF RN EA 15 MIN: HCPCS

## 2023-10-05 ASSESSMENT — ENCOUNTER SYMPTOMS: DYSPNEA ACTIVITY LEVEL: AFTER AMBULATING LESS THAN 10 FT

## 2023-10-05 NOTE — HOME HEALTH
SN greeted patient in home where she resides with spouse present. Patient is AOx4 in no apparent distress and agreeable to visit. SN assessment completed, condition stable, lungs clear in all fields, HRR, skin assessment reveals wound to sacrum, wound care order reviewed, wound care performed by SN using clean technique, patient tolerated well, no s/s of infection noted. Patient states appetite and hydration are WNL. Patient denies having any pain at this time. Patient uses 1.5L oxygen via nasal cannula on concentrator for SOB. Medications reviewed, no changes or issues to report at this time. SN educated patient on disease process and management, med management, infection control, home safety, fall precautions, taking medications as prescribed, call me first procedure, s/s to report to nurse, physician, and s/s that necessitate calling emergency personnel. Patient/spouse verbalized understanding via teachback method, all questions answered. SN ended visit with patient AOx4 in no apparent distress and agreeable to POC.

## 2023-10-06 ENCOUNTER — HOME CARE VISIT (OUTPATIENT)
Dept: SCHEDULING | Facility: HOME HEALTH | Age: 83
End: 2023-10-06
Payer: MEDICARE

## 2023-10-06 VITALS
SYSTOLIC BLOOD PRESSURE: 112 MMHG | TEMPERATURE: 98.6 F | HEART RATE: 89 BPM | OXYGEN SATURATION: 99 % | DIASTOLIC BLOOD PRESSURE: 40 MMHG

## 2023-10-06 PROCEDURE — G0158 HHC OT ASSISTANT EA 15: HCPCS

## 2023-10-06 ASSESSMENT — ENCOUNTER SYMPTOMS: PAIN LOCATION - PAIN QUALITY: BURNING

## 2023-10-06 NOTE — HOME HEALTH
Pt agreeable to Tx. NO reported med changes or incidents since last tx . Pt and reported following HEP daily using 16 oz water bottle for resistance  . Pt also reported she has begun cooking , using good energy conservation techniques , sitting, doing some steps ahead of time then resting . Pt also completing ADL with S to Mod I using energy conservation rtechniques or sitting and setting up ahead of time , as well as working slowly. Pt completed HEP with (B) UE AROM and 1 # resistance with improved endurance, completing more reps, with shoulder flexion in reps of 8 rather than 5 with good maintaining Sp02  at 98 % and pulse with no significant change. Pt amb in home and completed dynamic stangin exercises with wt shif and reaching with no resistance for approx 2 min x 2 with Sp02 maintained greater than 93% but heart rate increased to  requiring seated rest. Pt will continue to advance toward goals and dc wehn met.  WIll continue to address standing tolerance and endurance for ADL /IADL using energy conservation techniques consistantly

## 2023-10-07 VITALS
OXYGEN SATURATION: 96 % | SYSTOLIC BLOOD PRESSURE: 130 MMHG | TEMPERATURE: 97.5 F | HEART RATE: 83 BPM | DIASTOLIC BLOOD PRESSURE: 60 MMHG

## 2023-10-10 ENCOUNTER — HOME CARE VISIT (OUTPATIENT)
Dept: SCHEDULING | Facility: HOME HEALTH | Age: 83
End: 2023-10-10
Payer: MEDICARE

## 2023-10-10 VITALS
HEART RATE: 66 BPM | TEMPERATURE: 97 F | SYSTOLIC BLOOD PRESSURE: 114 MMHG | RESPIRATION RATE: 18 BRPM | DIASTOLIC BLOOD PRESSURE: 60 MMHG | OXYGEN SATURATION: 94 %

## 2023-10-10 PROCEDURE — G0299 HHS/HOSPICE OF RN EA 15 MIN: HCPCS

## 2023-10-10 ASSESSMENT — ENCOUNTER SYMPTOMS
DYSPNEA ACTIVITY LEVEL: AFTER AMBULATING LESS THAN 10 FT
COUGH CHARACTERISTICS: DRY
COUGH: 1

## 2023-10-12 ENCOUNTER — HOME CARE VISIT (OUTPATIENT)
Dept: SCHEDULING | Facility: HOME HEALTH | Age: 83
End: 2023-10-12
Payer: MEDICARE

## 2023-10-12 VITALS
HEART RATE: 93 BPM | RESPIRATION RATE: 20 BRPM | DIASTOLIC BLOOD PRESSURE: 60 MMHG | SYSTOLIC BLOOD PRESSURE: 100 MMHG | TEMPERATURE: 97.8 F | OXYGEN SATURATION: 96 %

## 2023-10-12 PROCEDURE — G0299 HHS/HOSPICE OF RN EA 15 MIN: HCPCS

## 2023-10-12 ASSESSMENT — ENCOUNTER SYMPTOMS
DYSPNEA ACTIVITY LEVEL: AFTER AMBULATING LESS THAN 10 FT
PAIN LOCATION - PAIN QUALITY: ACHY

## 2023-10-12 NOTE — HOME HEALTH
SN greeted patient in home where she resides with spouse and daughter present. Patient is AOx4 in no apparent distress and agreeable to sn routine home visit. SN assessment completed, condition stable. Skin assessment reveals wound to coccyx is intact. Patient states appetite and hydration are WNL. Patient uses 1.5L oxygen via nasal cannula on concentrator for SOB. Medications reviewed, no changes or issues to report at this time. SN educated patient on disease process and management, med management, infection control, home safety, fall precautions, taking medications as prescribed, call me first procedure, s/s to report to nurse, physician, and s/s that necessitate calling emergency personnel. Patient/spouse verbalized understanding via teachback method, all questions answered. 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

## 2023-10-13 ENCOUNTER — HOME CARE VISIT (OUTPATIENT)
Dept: SCHEDULING | Facility: HOME HEALTH | Age: 83
End: 2023-10-13
Payer: MEDICARE

## 2023-10-13 PROCEDURE — G0151 HHCP-SERV OF PT,EA 15 MIN: HCPCS

## 2023-10-13 NOTE — HOME HEALTH
Pt called to cx tx thos morning due to out of state company leaving and Pt wanted to spend time with thm today . PT scheduled for 1;30 so later Tx would be difficult for Pt to handle . Dr Ivis López office notified via phone call with  Gita at [phone]  Pt notified that Deejay Valverde will contact her for next week's dc/ recert  visit.

## 2023-10-14 VITALS
OXYGEN SATURATION: 98 % | RESPIRATION RATE: 20 BRPM | DIASTOLIC BLOOD PRESSURE: 56 MMHG | HEART RATE: 88 BPM | SYSTOLIC BLOOD PRESSURE: 109 MMHG | TEMPERATURE: 97.9 F

## 2023-10-16 ASSESSMENT — ENCOUNTER SYMPTOMS: PAIN LOCATION - PAIN QUALITY: ACHY

## 2023-10-16 NOTE — HOME HEALTH
Pt presents with mild discomfort in her coccyx, shows good understanding of importance of pressure relief and proper positioning to reduce discomfort. Pt;s daughter present during tx session today. PT reviewed HEP and instructed ot in BLE seated ex 2x10 all planes; sit-stand transfers x5 from her sofa for transfer training, pt able to perform with SBA/Sup showing improved technique and positioning; gait training with 4ww x 150ft x2 within her home with SBA/Sup, no LOB noted and no rest breaks needed; pt returned to sofa and sat down for a short rest break, O2 sats reading 94% on 2 lts of continuous oxygen through NC; pt then able to stand and ambulate 50ft x 2 using sc requiring CG/SBA, no LOB shown but pt stated she did not feel completly confident using sc; pt then returned to sofa for another short rest break; PT then instructed and educated pt's daughter on assisting her safely during gait training with the sc, pt able to ambulate 50ft x 2 with her daughter assisting and PT standing on the other side to assist as needed. Pt returned to sofa at the end of her session. Pt tolerated tx well. Both pt and daughter show good understanding of all instructions and will continue to practice short distance gait training with sc, pt has a gait belt at home. PT discussed up coming dc next week as pt is meeting all goals and progressing well, pt and daughter in agreement with upcoming dc.

## 2023-10-17 ENCOUNTER — HOME CARE VISIT (OUTPATIENT)
Dept: SCHEDULING | Facility: HOME HEALTH | Age: 83
End: 2023-10-17
Payer: MEDICARE

## 2023-10-17 VITALS
OXYGEN SATURATION: 96 % | RESPIRATION RATE: 20 BRPM | HEART RATE: 75 BPM | SYSTOLIC BLOOD PRESSURE: 110 MMHG | TEMPERATURE: 97.7 F | DIASTOLIC BLOOD PRESSURE: 60 MMHG

## 2023-10-17 PROCEDURE — G0152 HHCP-SERV OF OT,EA 15 MIN: HCPCS

## 2023-10-17 PROCEDURE — G0299 HHS/HOSPICE OF RN EA 15 MIN: HCPCS

## 2023-10-17 NOTE — HOME HEALTH
SN greeted patient in home where she resides with spouse and daughter present. Patient is AOx4 in no apparent distress and agreeable to sn home visit for discipline discharge. SN assessment completed, condition stable. Skin assessment reveals wound to coccyx is intact. Patient states appetite and hydration are WNL. Patient uses 1.5L oxygen via nasal cannula on concentrator for SOB. Medications reviewed, no changes or issues to report at this time. SN educated patient on disease process and management, med management, infection control, home safety, fall precautions, taking medications as prescribed, call me first procedure, s/s to report to nurse, physician, and s/s that necessitate calling emergency personnel. Patient/spouse verbalized understanding via teachback method, all questions answered. SN ended visit with patient AOx4 in no apparent distress and agreeable to discharge from nursing discipline due to wound is healed, all goals are met.

## 2023-10-19 VITALS
OXYGEN SATURATION: 98 % | TEMPERATURE: 97.5 F | RESPIRATION RATE: 20 BRPM | SYSTOLIC BLOOD PRESSURE: 130 MMHG | DIASTOLIC BLOOD PRESSURE: 60 MMHG | HEART RATE: 85 BPM

## 2023-10-19 NOTE — HOME HEALTH
Pt / CG agreeable to OT DC. Reported No med changes or incidents since last tx . Pt anf CG  reported following HEP daily using 16 oz water bottle for resistance , she demonstrated independence with program. Pt verbilized energy conservation tech such planning day/ activities a head, sitting vs standing, breathing tech. and she demosntrated Mod I with kitchen and bathroom accesssibility using rolator and managing O2 tubbing independently . She  reported she has begun cooking , using good energy conservation techniques , sitting, doing some steps ahead of time then resting . Pt also completing showers  with S to Mod I using energy conservation rtechniques or sitting and setting up ahead of time , as well as working slowly, she demosntrated Mod I with shower transfers. Pt cont using O2 continously  with O2 sat at 98 % during dynamic standing activities . She tolerated 4 min x 2 standing activity . Family is very supportive and pt is motivated to continue adding functional activities as tolerated.  No further OT is recommended at this time

## 2023-10-20 ENCOUNTER — HOME CARE VISIT (OUTPATIENT)
Dept: SCHEDULING | Facility: HOME HEALTH | Age: 83
End: 2023-10-20

## 2023-10-21 VITALS
HEART RATE: 78 BPM | OXYGEN SATURATION: 95 % | TEMPERATURE: 97.5 F | RESPIRATION RATE: 19 BRPM | SYSTOLIC BLOOD PRESSURE: 110 MMHG | DIASTOLIC BLOOD PRESSURE: 53 MMHG

## 2023-10-21 ASSESSMENT — ENCOUNTER SYMPTOMS: PAIN LOCATION - PAIN QUALITY: ACHY
